# Patient Record
Sex: FEMALE | Race: WHITE
[De-identification: names, ages, dates, MRNs, and addresses within clinical notes are randomized per-mention and may not be internally consistent; named-entity substitution may affect disease eponyms.]

---

## 2017-01-16 ENCOUNTER — HOSPITAL ENCOUNTER (OUTPATIENT)
Dept: HOSPITAL 61 - KCIC MRI | Age: 42
Discharge: HOME | End: 2017-01-16
Payer: OTHER GOVERNMENT

## 2017-01-16 DIAGNOSIS — G25.3: Primary | ICD-10-CM

## 2017-01-16 PROCEDURE — 72141 MRI NECK SPINE W/O DYE: CPT

## 2017-01-16 NOTE — [facility]
PROCEDURE 

MRI cervical spine without contrast. 

 

HISTORY 

Myoclonus, random jerking of the upper and lower extremities bilaterally 

for several months 

 

TECHNIQUE 

Sagittal axial T2, sagittal T1, sagittal STIR images were acquired of 

cervical spine 

Contrast: None 

 

COMPARISON 

None 

 

FINDINGS 

There is motion degradation. Cervical cord caliber is within normal limits

without convincing focal signal abnormality allowing for motion artifact. 

Cervical vertebral body stature and AP alignment are preserved. There is 

no significant focal marrow edema. Intervertebral disc spaces are overall 

adequate. There is mild disc desiccation C3-4 to C5-C6. 

C2-3: Spinal canal and neural foramina are adequate. 

C3-4: There is negligible bulge. Neural foramina and spinal canal are 

adequate. 

C4-5: There is negligible posterior bulge. There is anterior annular tear.

There is uncovertebral degenerative change greater on the right. There is 

minimal narrowing of the neural foramina bilaterally. There is mild to 

moderate left facet degenerative change. 

C5-C6: Spinal canal and neural foramina are adequate. 

C6-7: Spinal canal and neural foramina are adequate. 

C7-T1: Spinal canal and neural foramina are adequate. 

 

IMPRESSION 

1. There is no significant cervical spinal stenosis. There is no 

convincing cervical cord signal abnormality allowing for artifact of the 

cord in part from motion.

2. There is mild neural foramina compromise at C4-5 in part from 

uncovertebral degenerative change.

 

 

 

Electronically signed by: Luis Jordan MD (Jan 16, 2017 16:08:09)

## 2018-08-14 ENCOUNTER — HOSPITAL ENCOUNTER (OUTPATIENT)
Dept: HOSPITAL 63 - MAMMO | Age: 43
Discharge: HOME | End: 2018-08-14
Payer: OTHER GOVERNMENT

## 2018-08-14 DIAGNOSIS — Z12.31: Primary | ICD-10-CM

## 2018-08-14 PROCEDURE — 77067 SCR MAMMO BI INCL CAD: CPT

## 2018-08-15 NOTE — RAD
History:  Routine screening.



Technique:  Bilateral digital mammographic routine views were obtained with

CAD - computer aided detection.



Comparison: None.



Findings:



Breast Tissue Density B :The breast tissue is composed of mixed fatty and

fibroglandular tissue.



There are no suspicious masses, microcalcifications or areas of architectural

distortion.



Impression:



Negative mammogram.



BI-RADS Category 1:  Negative.



Recommendation: In the absence of new clinical symptoms or change in physical

exam, annual screening mammography is recommended.



A mammogram does not have 100% sensitivity and therefore a negative imaging

study should not delay further work up of a suspicious abnormality.



The patient will receive a letter with the results in the mail.

Patient information is entered into the reminder system with a target due date

for the next screening mammogram.  The patient will receive a reminder.

"Our facility is accredited by the [employer] Mammography

Program."

## 2021-04-14 ENCOUNTER — HOSPITAL ENCOUNTER (OUTPATIENT)
Dept: HOSPITAL 63 - MAMMO | Age: 46
End: 2021-04-14
Attending: FAMILY MEDICINE
Payer: OTHER GOVERNMENT

## 2021-04-14 DIAGNOSIS — Z12.31: Primary | ICD-10-CM

## 2021-04-14 PROCEDURE — 77067 SCR MAMMO BI INCL CAD: CPT

## 2021-04-14 PROCEDURE — 77063 BREAST TOMOSYNTHESIS BI: CPT

## 2021-04-16 NOTE — RAD
DATE: 4/14/2021



EXAM: MAMMO ELI SCREENING BILATERAL



HISTORY: Screening



COMPARISON: 8/14/2018



This study was interpreted with the benefit of Computerized Aided Detection

(CAD).





Breast Density: SCATTERED The breast parenchyma shows scattered fibroglandular

densities. Breast parenchyma level B.





FINDINGS: No mass, suspicious calcification, or architectural distortion in

either breast.  





IMPRESSION: No evidence of malignancy. 







BI-RADS CATEGORY: 1 NEGATIVE



RECOMMENDED FOLLOW-UP: 12M 12 MONTH FOLLOW-UP



PQRS compliance statement: Patient information was entered into a reminder

system with a target due date for the next mammogram.



Mammography is a sensitive method for finding small breast cancers, but it

does not detect them all and is not a substitute for careful clinical

examination.  A negative mammogram does not negate a clinically suspicious

finding and should not result in delay in biopsying a clinically suspicious

abnormality.



"Our facility is accredited by the [employer] Mammography

Program."